# Patient Record
Sex: FEMALE | Race: WHITE | Employment: OTHER | URBAN - METROPOLITAN AREA
[De-identification: names, ages, dates, MRNs, and addresses within clinical notes are randomized per-mention and may not be internally consistent; named-entity substitution may affect disease eponyms.]

---

## 2017-03-18 ENCOUNTER — HOSPITAL ENCOUNTER (EMERGENCY)
Facility: CLINIC | Age: 42
Discharge: HOME | End: 2017-03-18
Attending: EMERGENCY MEDICINE | Admitting: EMERGENCY MEDICINE
Payer: COMMERCIAL

## 2017-03-18 ENCOUNTER — APPOINTMENT (OUTPATIENT)
Dept: GENERAL RADIOLOGY | Facility: CLINIC | Age: 42
End: 2017-03-18
Attending: EMERGENCY MEDICINE
Payer: COMMERCIAL

## 2017-03-18 VITALS
RESPIRATION RATE: 20 BRPM | DIASTOLIC BLOOD PRESSURE: 108 MMHG | SYSTOLIC BLOOD PRESSURE: 182 MMHG | OXYGEN SATURATION: 97 % | TEMPERATURE: 97.5 F

## 2017-03-18 DIAGNOSIS — M75.42 IMPINGEMENT SYNDROME OF LEFT SHOULDER: ICD-10-CM

## 2017-03-18 DIAGNOSIS — M25.512 LEFT ANTERIOR SHOULDER PAIN: ICD-10-CM

## 2017-03-18 PROCEDURE — 20610 DRAIN/INJ JOINT/BURSA W/O US: CPT | Mod: LT | Performed by: EMERGENCY MEDICINE

## 2017-03-18 PROCEDURE — 99283 EMERGENCY DEPT VISIT LOW MDM: CPT | Mod: 25 | Performed by: EMERGENCY MEDICINE

## 2017-03-18 PROCEDURE — 99283 EMERGENCY DEPT VISIT LOW MDM: CPT | Mod: 25

## 2017-03-18 PROCEDURE — 73030 X-RAY EXAM OF SHOULDER: CPT | Mod: LT

## 2017-03-18 PROCEDURE — 20610 DRAIN/INJ JOINT/BURSA W/O US: CPT | Mod: LT

## 2017-03-18 RX ORDER — TRIAMCINOLONE ACETONIDE 40 MG/ML
40 INJECTION, SUSPENSION INTRA-ARTICULAR; INTRAMUSCULAR ONCE
Status: DISCONTINUED | OUTPATIENT
Start: 2017-03-18 | End: 2017-03-18 | Stop reason: HOSPADM

## 2017-03-18 RX ORDER — OXYCODONE AND ACETAMINOPHEN 5; 325 MG/1; MG/1
1-2 TABLET ORAL EVERY 4 HOURS PRN
Qty: 15 TABLET | Refills: 0 | Status: SHIPPED | OUTPATIENT
Start: 2017-03-18 | End: 2021-10-27

## 2017-03-18 NOTE — ED AVS SNAPSHOT
[facility] Emergency Department    [address] [address]    Phone:  [phone]    Fax:  [phone]                                       Tonia Herndon   MRN: [id number]    Department:  [facility] Emergency Department   Date of Visit:  3/18/2017           After Visit Summary Signature Page     I have received my discharge instructions, and my questions have been answered. I have discussed any challenges I see with this plan with the nurse or doctor.    ..........................................................................................................................................  Patient/Patient Representative Signature      ..........................................................................................................................................  Patient Representative Print Name and Relationship to Patient    ..................................................               ................................................  Date                                            Time    ..........................................................................................................................................  Reviewed by Signature/Title    ...................................................              ..............................................  Date                                                            Time

## 2017-03-18 NOTE — DISCHARGE INSTRUCTIONS
Order placed for physical therapy  Please call physical therapy department Monday to establish 1st appointment  Ice 30 minutes 4 times daily  Gentle range of motion should be completed after icing  Ibuprofen or Aleve for mild to moderate pain  Percocet only for severe pain  Recommend calling the orthopedic sports medicine clinic and arrange an appointment for 2 weeks.

## 2017-03-18 NOTE — ED PROVIDER NOTES
History     Chief Complaint   Patient presents with     Shoulder Pain     left, no known injury, started Thursday     HPI     Ms. Tonia Herndon is a 41 year old female who presents to the ED today for evaluation of shoulder pain. The patient reports acute onset of severe left shoulder pain starting 3/16/17. She states pain is exacerbated with active or passive range of motion of her left shoulder. She endorses tenderness to palpation of the left shoulder. She notes pain has made it difficult to sleep on her left side. The patient is ultimately uncertain as to what may have caused her sudden pain. She wonders if this is due to sleeping in her left side in an unusual manner. She denies related shoulder related injury or trauma. She denies fall. She denies strain her left shoulder from lifting.     I have reviewed the Medications, Allergies, Past Medical and Surgical History, and Social History in the Epic system.    There is no problem list on file for this patient.    No current outpatient prescriptions on file.     No Known Allergies    Review of Systems   Musculoskeletal:        Left shoulder pain     Constitutional: Negative.    HENT: Negative.    Eyes: Negative.    Respiratory: Negative.    Cardiovascular: Negative.    Gastrointestinal: Negative.    Endocrine: Negative.    Genitourinary: Negative.    Musculoskeletal: Negative.    Skin: Negative.    Allergic/Immunologic: Negative.    Neurological: Negative.    Hematological: Negative.    Psychiatric/Behavioral: Negative.    All other systems reviewed and are negative.    Physical Exam   BP: (!) 187/116  Heart Rate: 97  Temp: 97.5  F (36.4  C)  Resp: 20  SpO2: 97 %    Physical Exam     Patient appears uncomfortable  Elevated blood pressure  Morbidly obese  Left shoulder exam: Arm held abducted alongside of patient.  Any movement with forward flexion or abduction caused severe pain.  There is no emptiness when palpating the glenohumeral joint to suggest any  subluxation or dislocation.  Very tender in the anterior joint line/capsule.  Positive impingement.  Positive drop arm test.  CMS left upper extremity normal  ED Course     ED Course     Procedures     Left shoulder injection:   Verbal consent  Shoulder area prepped with chlorhexidine scrub  Sterile technique  40 mg triamcinolone mixed with 4 cc 0.25% Marcaine  Anterior approach-complaining intra-articular injection.  Band-Aid applied             Results for orders placed or performed during the hospital encounter of 03/18/17   XR Shoulder Left G/E 3 Views    Narrative    XR SHOULDER LT G/E 3 VW 3/18/2017 9:17 AM    HISTORY: Pain      Impression    IMPRESSION: Negative.    SNOW ASHLEY MD         8:28 AM Patient Assessed.     Assessments & Plan (with Medical Decision Making)  41-year-old female presents with left shoulder pain.  No specific injury.  He was more active with [place] activities when she was traveling last week in [place].  Also did heavier lifting with suitcases etc.  Examination noted a near frozen shoulder with extreme loss of range of motion.  Tenderness primarily in the anterior joint line Bear the insertion of the supraspinatus.  She had evidence for impingement syndrome.  X-rays left shoulder normal  Left shoulder injection completed.  Patient referred to physical therapy.  Advised refraining from applying heat and switching to ice.  May use ibuprofen for mild to moderate pain. Percocet for severe pain.        I have reviewed the nursing notes.    I have reviewed the findings, diagnosis, plan and need for follow up with the patient.    New Prescriptions    No medications on file       Final diagnoses:   Left anterior shoulder pain   Impingement syndrome of left shoulder     This document serves as a record of the services and decisions personally performed and made by No att. providers found. It was created on HIS/HER behalf by Paul Valle, a trained medical scribe. The creation of this  document is based the provider's statements to the medical scribe.  Paul Valle 8:28 AM 3/18/2017    Provider:   The information in this document, created by the medical scribe for me, accurately reflects the services I personally performed and the decisions made by me. I have reviewed and approved this document for accuracy prior to leaving the patient care area.  No att. providers found 8:28 AM 3/18/2017    3/18/2017   [facility] EMERGENCY DEPARTMENT     Nathan Ruiz DO  03/18/17 0943

## 2017-03-18 NOTE — ED AVS SNAPSHOT
[facility] Emergency Department    [address] [address]    Phone:  [phone]    Fax:  [phone]                                       Tonia Herndon   MRN: [id number]    Department:  [facility] Emergency Department   Date of Visit:  3/18/2017           Patient Information     Date Of Birth          1975        Your diagnoses for this visit were:     Left anterior shoulder pain     Impingement syndrome of left shoulder        You were seen by Nathan Ruiz DO.      Follow-up Information     Follow up with Radha Wilhelm MD.    Specialty:  Family Practice    Contact information:    [facility]  [address] [address]  [phone]          Discharge Instructions       Order placed for physical therapy  Please call physical therapy department Monday to establish 1st appointment  Ice 30 minutes 4 times daily  Gentle range of motion should be completed after icing  Ibuprofen or Aleve for mild to moderate pain  Percocet only for severe pain  Recommend calling the orthopedic sports medicine clinic and arrange an appointment for 2 weeks.    24 Hour Appointment Hotline       To make an appointment at any [facility], call [phone] ([phone]). If you don't have a family doctor or clinic, we will help you find one. [facility] clinics are conveniently located to serve the needs of you and your family.          ED Discharge Orders     PHYSICAL THERAPY REFERRAL       *This therapy referral will be filtered to a centralized scheduling office at [facility] and the patient will receive a call to schedule an appointment at a [facility] location most convenient for them. *     [facility] provides Physical Therapy evaluation and treatment and many specialty services across the [facility] system.  If requesting a specialty program, please choose from the list below.    If you have not heard from the  scheduling office within 2 business days, please call [phone] for all locations, with the exception of [place], please call [phone].  Treatment: Evaluation & Treatment  Special Instructions/Modalities: none  Special Programs: None    Please be aware that coverage of these services is subject to the terms and limitations of your health insurance plan.  Call member services at your health plan with any benefit or coverage questions.      **Note to Provider:  If you are referring outside of [facility] for the therapy appointment, please list the name of the location in the  special instructions  above, print the referral and give to the patient to schedule the appointment.                     Review of your medicines      START taking        Dose / Directions Last dose taken    oxyCODONE-acetaminophen 5-325 MG per tablet   Commonly known as:  PERCOCET   Dose:  1-2 tablet   Quantity:  15 tablet        Take 1-2 tablets by mouth every 4 hours as needed for pain   Refills:  0                Prescriptions were sent or printed at these locations (1 Prescription)                   Other Prescriptions                Printed at Department/Unit printer (1 of 1)         oxyCODONE-acetaminophen (PERCOCET) 5-325 MG per tablet                Procedures and tests performed during your visit     XR Shoulder Left G/E 3 Views      Orders Needing Specimen Collection     None      Pending Results     No orders found from 3/16/2017 to 3/19/2017.            Pending Culture Results     No orders found from 3/16/2017 to 3/19/2017.             Test Results from your hospital stay     3/18/2017  9:18 AM - Interface, Radiant Ib      Narrative     XR SHOULDER LT G/E 3 VW 3/18/2017 9:17 AM    HISTORY: Pain        Impression     IMPRESSION: Negative.    SNOW ASHLEY MD                Thank you for choosing [facility]       Thank you for choosing [facility] for your care. Our goal is always to provide you with excellent care. Hearing back from  "our patients is one way we can continue to improve our services. Please take a few minutes to complete the written survey that you may receive in the mail after you visit with us. Thank you!        [employer] Information     [facility] lets you send messages to your doctor, view your test results, renew your prescriptions, schedule appointments and more. To sign up, go to [email or web address] . Click on \"Log in\" on the left side of the screen, which will take you to the Welcome page. Then click on \"Sign up Now\" on the right side of the page.     You will be asked to enter the access code listed below, as well as some personal information. Please follow the directions to create your username and password.     Your access code is: [email or web address]  Expires: 2017  9:40 AM     Your access code will  in 90 days. If you need help or a new code, please call your [facility] clinic or [phone].        Care EveryWhere ID     This is your Care EveryWhere ID. This could be used by other organizations to access your [facility] medical records  [id number]        After Visit Summary       This is your record. Keep this with you and show to your community pharmacist(s) and doctor(s) at your next visit.                  "

## 2017-08-05 ENCOUNTER — HEALTH MAINTENANCE LETTER (OUTPATIENT)
Age: 42
End: 2017-08-05

## 2021-10-12 ENCOUNTER — TRANSFERRED RECORDS (OUTPATIENT)
Dept: HEALTH INFORMATION MANAGEMENT | Facility: CLINIC | Age: 46
End: 2021-10-12

## 2021-10-12 LAB — PAP SMEAR - HIM PATIENT REPORTED: NEGATIVE

## 2021-10-15 ENCOUNTER — HOSPITAL ENCOUNTER (OUTPATIENT)
Dept: CT IMAGING | Facility: CLINIC | Age: 46
Discharge: HOME | End: 2021-10-15
Attending: OBSTETRICS & GYNECOLOGY | Admitting: OBSTETRICS & GYNECOLOGY
Payer: COMMERCIAL

## 2021-10-15 DIAGNOSIS — R19.00 ABDOMINAL MASS, UNSPECIFIED ABDOMINAL LOCATION: ICD-10-CM

## 2021-10-15 PROCEDURE — 74177 CT ABD & PELVIS W/CONTRAST: CPT

## 2021-10-15 PROCEDURE — 250N000011 HC RX IP 250 OP 636: Performed by: OBSTETRICS & GYNECOLOGY

## 2021-10-15 RX ORDER — IOPAMIDOL 755 MG/ML
100 INJECTION, SOLUTION INTRAVASCULAR ONCE
Status: COMPLETED | OUTPATIENT
Start: 2021-10-15 | End: 2021-10-15

## 2021-10-15 RX ADMIN — IOPAMIDOL 100 ML: 755 INJECTION, SOLUTION INTRAVENOUS at 08:27

## 2021-10-27 ENCOUNTER — OFFICE VISIT (OUTPATIENT)
Dept: FAMILY MEDICINE | Facility: CLINIC | Age: 46
End: 2021-10-27
Payer: COMMERCIAL

## 2021-10-27 VITALS
BODY MASS INDEX: 53.92 KG/M2 | DIASTOLIC BLOOD PRESSURE: 120 MMHG | SYSTOLIC BLOOD PRESSURE: 170 MMHG | HEART RATE: 92 BPM | WEIGHT: 293 LBS | OXYGEN SATURATION: 98 % | HEIGHT: 62 IN | TEMPERATURE: 97.2 F

## 2021-10-27 DIAGNOSIS — Z23 NEED FOR PROPHYLACTIC VACCINATION AND INOCULATION AGAINST INFLUENZA: ICD-10-CM

## 2021-10-27 DIAGNOSIS — R73.03 PREDIABETES: ICD-10-CM

## 2021-10-27 DIAGNOSIS — E78.5 HYPERLIPIDEMIA LDL GOAL <100: ICD-10-CM

## 2021-10-27 DIAGNOSIS — E66.01 MORBID OBESITY (H): ICD-10-CM

## 2021-10-27 DIAGNOSIS — I10 BENIGN ESSENTIAL HYPERTENSION: Primary | ICD-10-CM

## 2021-10-27 PROBLEM — E78.00 HIGH CHOLESTEROL: Status: ACTIVE | Noted: 2021-10-06

## 2021-10-27 PROBLEM — E78.00 HIGH CHOLESTEROL: Status: RESOLVED | Noted: 2021-10-06 | Resolved: 2021-10-27

## 2021-10-27 PROCEDURE — 90471 IMMUNIZATION ADMIN: CPT | Performed by: NURSE PRACTITIONER

## 2021-10-27 PROCEDURE — 90686 IIV4 VACC NO PRSV 0.5 ML IM: CPT | Performed by: NURSE PRACTITIONER

## 2021-10-27 PROCEDURE — 99204 OFFICE O/P NEW MOD 45 MIN: CPT | Mod: 25 | Performed by: NURSE PRACTITIONER

## 2021-10-27 RX ORDER — LOSARTAN POTASSIUM AND HYDROCHLOROTHIAZIDE 25; 100 MG/1; MG/1
1 TABLET ORAL DAILY
Qty: 90 TABLET | Refills: 0 | Status: SHIPPED | OUTPATIENT
Start: 2021-10-27 | End: 2021-11-11

## 2021-10-27 RX ORDER — NYSTATIN 100000 [USP'U]/G
POWDER TOPICAL
COMMUNITY
Start: 2021-10-07

## 2021-10-27 RX ORDER — ATORVASTATIN CALCIUM 20 MG/1
20 TABLET, FILM COATED ORAL DAILY
Qty: 90 TABLET | Refills: 3 | Status: SHIPPED | OUTPATIENT
Start: 2021-10-27

## 2021-10-27 RX ORDER — NYSTATIN AND TRIAMCINOLONE ACETONIDE 100000; 1 [USP'U]/G; MG/G
OINTMENT TOPICAL
COMMUNITY
Start: 2021-10-07

## 2021-10-27 ASSESSMENT — MIFFLIN-ST. JEOR: SCORE: 2061.29

## 2021-10-27 NOTE — PATIENT INSTRUCTIONS
Start Hyzaar 1 tablet daily morning    Start Lipitor 20 mg daily evening    Recheck labs and BP in 2 weeks     Will recheck cholesterol in 3 months

## 2021-10-27 NOTE — PROGRESS NOTES
"  Assessment & Plan     Benign essential hypertension    - recommended to start losartan-hydrochlorothiazide (HYZAAR) 100-25 MG tablet; Take 1 tablet by mouth daily  - Basic metabolic panel  (Ca, Cl, CO2, Creat, Gluc, K, Na, BUN); Future  -repeat BMP in 1 week and follow up with RN for BP recheck in 2 weeks   - atorvastatin (LIPITOR) 20 MG tablet; Take 1 tablet (20 mg) by mouth daily    Morbid obesity (H)  -recommended to work on weight loss  -will try Trulicity if covered by insurance to help with weight loss   - dulaglutide (TRULICITY) 0.75 MG/0.5ML pen; Inject 0.75 mg Subcutaneous every 7 days    Prediabetes    - dulaglutide (TRULICITY) 0.75 MG/0.5ML pen; Inject 0.75 mg Subcutaneous every 7 days    Hyperlipidemia LDL goal <100    - Lipid panel reflex to direct LDL Fasting; Future    Need for prophylactic vaccination and inoculation against influenza    - INFLUENZA VACCINE IM > 6 MONTHS VALENT IIV4 (AFLURIA/FLUZONE)         BMI:   Estimated body mass index is 60.9 kg/m  as calculated from the following:    Height as of this encounter: 1.562 m (5' 1.5\").    Weight as of this encounter: 148.6 kg (327 lb 9.6 oz).   Weight management plan: Discussed healthy diet and exercise guidelines    See Patient Instructions    Return in about 2 weeks (around 11/10/2021) for BP Recheck, Lab Work.    HOME [facility]    Salo Randall is a 46 year old who presents for the following health issues elevated blood pressure. She recently did labs through her OB GYN doctor, labs were normal, except for A1C in prediabetic range 6.0% and . Also had high BP during OB GYN visit.   Patient is feeling well, denies any symptoms of elevated blood pressure such as dizziness, headaches, chest pain and shortness of breath.  Denies history of hypertension except when she was pregnant 15 years ago, she had borderline high BP during third trimester. Had never been treated for " "hypertension.        Review of Systems   Constitutional, HEENT, cardiovascular, pulmonary, gi and gu systems are negative, except as otherwise noted.      Objective    BP (!) 170/120 (BP Location: Right arm, Patient Position: Sitting, Cuff Size: Adult Large)   Pulse 92   Temp 97.2  F (36.2  C) (Tympanic)   Ht 1.562 m (5' 1.5\")   Wt 148.6 kg (327 lb 9.6 oz)   SpO2 98%   BMI 60.90 kg/m    Body mass index is 60.9 kg/m .  Physical Exam   GENERAL: healthy, alert and no distress  EYES: Eyes grossly normal to inspection, PERRL and conjunctivae and sclerae normal  NECK: no adenopathy, no asymmetry, masses, or scars and thyroid normal to palpation  RESP: lungs clear to auscultation - no rales, rhonchi or wheezes  CV: regular rate and rhythm, normal S1 S2, no S3 or S4, no murmur, click or rub, no peripheral edema and peripheral pulses strong  MS: no gross musculoskeletal defects noted, no edema  NEURO: Normal strength and tone, mentation intact and speech normal  PSYCH: mentation appears normal, affect normal/bright                "

## 2021-11-11 ENCOUNTER — ALLIED HEALTH/NURSE VISIT (OUTPATIENT)
Dept: FAMILY MEDICINE | Facility: CLINIC | Age: 46
End: 2021-11-11
Payer: COMMERCIAL

## 2021-11-11 ENCOUNTER — LAB (OUTPATIENT)
Dept: LAB | Facility: CLINIC | Age: 46
End: 2021-11-11
Payer: COMMERCIAL

## 2021-11-11 ENCOUNTER — TELEPHONE (OUTPATIENT)
Dept: FAMILY MEDICINE | Facility: CLINIC | Age: 46
End: 2021-11-11

## 2021-11-11 VITALS — SYSTOLIC BLOOD PRESSURE: 168 MMHG | HEART RATE: 80 BPM | DIASTOLIC BLOOD PRESSURE: 82 MMHG

## 2021-11-11 DIAGNOSIS — I10 BENIGN ESSENTIAL HYPERTENSION: ICD-10-CM

## 2021-11-11 DIAGNOSIS — I10 BENIGN ESSENTIAL HYPERTENSION: Primary | ICD-10-CM

## 2021-11-11 LAB
ANION GAP SERPL CALCULATED.3IONS-SCNC: 3 MMOL/L (ref 3–14)
BUN SERPL-MCNC: 12 MG/DL (ref 7–30)
CALCIUM SERPL-MCNC: 9.3 MG/DL (ref 8.5–10.1)
CHLORIDE BLD-SCNC: 103 MMOL/L (ref 94–109)
CO2 SERPL-SCNC: 31 MMOL/L (ref 20–32)
CREAT SERPL-MCNC: 0.69 MG/DL (ref 0.52–1.04)
GFR SERPL CREATININE-BSD FRML MDRD: >90 ML/MIN/1.73M2
GLUCOSE BLD-MCNC: 101 MG/DL (ref 70–99)
POTASSIUM BLD-SCNC: 4.1 MMOL/L (ref 3.4–5.3)
SODIUM SERPL-SCNC: 137 MMOL/L (ref 133–144)

## 2021-11-11 PROCEDURE — 80048 BASIC METABOLIC PNL TOTAL CA: CPT

## 2021-11-11 PROCEDURE — 36415 COLL VENOUS BLD VENIPUNCTURE: CPT

## 2021-11-11 PROCEDURE — 99207 PR NO CHARGE NURSE ONLY: CPT

## 2021-11-11 RX ORDER — AMLODIPINE BESYLATE 5 MG/1
5 TABLET ORAL DAILY
Qty: 30 TABLET | Refills: 0 | Status: SHIPPED | OUTPATIENT
Start: 2021-11-11 | End: 2021-12-10

## 2021-11-11 NOTE — TELEPHONE ENCOUNTER
Called pt & read providers message. Pt verbalized understanding & will call & schedule an appt for RN BP check.    Neida Briones RN

## 2021-11-11 NOTE — TELEPHONE ENCOUNTER
Provider covering for Tonia Rosen Yanick is a 46 year old year old patient who comes in today for a Blood Pressure check because of new medication.   10/27 started on losartan-hydrochlorothiazide &  lipitor     Vital Signs as repeated by RN   166/78 HR 85  168/82 HR 88     Patient is taking medication as prescribed  Patient is tolerating medications well.    Patient is monitoring Blood Pressure at home.  Average readings if yes are   Pt has wrist BP monitor & states readings are inconsistent. Can range from 140-180's systolic    Current complaints: frequent headaches since starting medication. Pt does not normally have headaches.     Disposition:  Routed to provider for review     Neida Briones RN

## 2021-11-11 NOTE — PROGRESS NOTES
Tonia Herndon is a 46 year old year old patient who comes in today for a Blood Pressure check because of new medication.   10/27 started on losartan-hydrochlorothiazide &  lipitor    Vital Signs as repeated by RN   166/78 HR 85  168/82 HR 88    Patient is taking medication as prescribed  Patient is tolerating medications well.    Patient is monitoring Blood Pressure at home.  Average readings if yes are   Pt has wrist BP monitor & states readings are inconsistent. Can range from 140-180's systolic    Current complaints: frequent headaches since starting medication. Pt does not normally have headaches.    Disposition:  Routed to provider for review    Neida Briones RN

## 2021-11-11 NOTE — TELEPHONE ENCOUNTER
Recommend patient stop the medication  Start Amlodipine 5 mg once daily and schedule RN visit to recheck blood pressure and patient to bring her home blood pressure monitor with her.

## 2021-11-12 ENCOUNTER — HOSPITAL ENCOUNTER (OUTPATIENT)
Dept: MAMMOGRAPHY | Facility: CLINIC | Age: 46
Discharge: HOME | End: 2021-11-12
Attending: OBSTETRICS & GYNECOLOGY | Admitting: OBSTETRICS & GYNECOLOGY
Payer: COMMERCIAL

## 2021-11-12 DIAGNOSIS — Z12.31 SCREENING MAMMOGRAM, ENCOUNTER FOR: ICD-10-CM

## 2021-11-12 PROCEDURE — 77067 SCR MAMMO BI INCL CAD: CPT

## 2021-11-14 ENCOUNTER — HEALTH MAINTENANCE LETTER (OUTPATIENT)
Age: 46
End: 2021-11-14

## 2021-12-09 ENCOUNTER — TELEPHONE (OUTPATIENT)
Dept: FAMILY MEDICINE | Facility: CLINIC | Age: 46
End: 2021-12-09

## 2021-12-09 ENCOUNTER — ALLIED HEALTH/NURSE VISIT (OUTPATIENT)
Dept: FAMILY MEDICINE | Facility: CLINIC | Age: 46
End: 2021-12-09
Payer: COMMERCIAL

## 2021-12-09 VITALS — DIASTOLIC BLOOD PRESSURE: 96 MMHG | SYSTOLIC BLOOD PRESSURE: 174 MMHG | HEART RATE: 64 BPM

## 2021-12-09 DIAGNOSIS — I10 BENIGN ESSENTIAL HYPERTENSION: ICD-10-CM

## 2021-12-09 DIAGNOSIS — I10 BENIGN ESSENTIAL HYPERTENSION: Primary | ICD-10-CM

## 2021-12-09 PROCEDURE — 99207 PR NO CHARGE NURSE ONLY: CPT

## 2021-12-09 NOTE — TELEPHONE ENCOUNTER
Tonia Rosen MARIA C Herndon is a 46 year old year old patient who comes in today for a Blood Pressure check because of medication change.  11/11/21: pt stopped losartan-hydrochlorothiazide due to headaches & started on amlodipine 5mg     Vital Signs as repeated by RN   180/94 HR 70  174/96 HR 64     Patient is taking medication as prescribed  Patient is tolerating medications well.  Patient is not monitoring Blood Pressure at home. machine broke, needs to purchase new one  Current complaints: states headaches gone but has noticed more swelling in fingers since stopping the hydrochlorothiazide.     Disposition:  Routed to provider for review.     [facility] [place]     [facility]

## 2021-12-09 NOTE — TELEPHONE ENCOUNTER
BP uncontrolled, possible side effects from BP meds, Please help patient to schedule office visit    HOME [facility] CNP

## 2021-12-09 NOTE — PROGRESS NOTES
Tonia Herndon is a 46 year old year old patient who comes in today for a Blood Pressure check because of medication change.  11/11/21: pt stopped losartan-hydrochlorothiazide due to headaches & started on amlodipine 5mg    Vital Signs as repeated by RN   180/94 HR 70  174/96 HR 64    Patient is taking medication as prescribed  Patient is tolerating medications well.  Patient is not monitoring Blood Pressure at home. machine broke, needs to purchase new one  Current complaints: states headaches gone but has noticed more swelling in fingers since stopping the hydrochlorothiazide.    Disposition:  Routed to provider for review.    Pharmacy: [facility] in [place]    Neida Anselmo AUGUST

## 2021-12-10 RX ORDER — AMLODIPINE BESYLATE 5 MG/1
7.5 TABLET ORAL DAILY
Qty: 45 TABLET | Refills: 0 | Status: SHIPPED | OUTPATIENT
Start: 2021-12-10 | End: 2021-12-15

## 2021-12-10 NOTE — TELEPHONE ENCOUNTER
Please also advise patient to increase Norvasc to 7.5 mg daily and monitor BP at home and keep appt on 1/05 unless if her BP will still be significantly elevated or over 150/90 range then see any available provider within a week.     Thank you     HOME [facility] CNP

## 2021-12-10 NOTE — TELEPHONE ENCOUNTER
Scheduled with Jessika on 1/5/2021, was your fist opening, unless you want her to be sooner by someone else? Patient wants to know if she should take the Amlodipine still if so can it be refilled?    Chanda Isidro CSS on 12/10/2021 at 11:02 AM

## 2021-12-13 DIAGNOSIS — I10 BENIGN ESSENTIAL HYPERTENSION: ICD-10-CM

## 2021-12-14 NOTE — TELEPHONE ENCOUNTER
Patient given provider's information and recommendations below with understanding voiced.  She'll  Rx with new instructions and has no further questions at this time.     Hina Cox RN  [facility]

## 2021-12-15 RX ORDER — AMLODIPINE BESYLATE 5 MG/1
TABLET ORAL
Qty: 45 TABLET | Refills: 0 | Status: SHIPPED | OUTPATIENT
Start: 2021-12-15

## 2021-12-15 NOTE — TELEPHONE ENCOUNTER
Pending Prescriptions:                       Disp   Refills    amLODIPine (NORVASC) 5 MG tablet [Pharmac*45 tab*0            Sig: TAKE 1 AND 1/2 TABLETS BY MOUTH ONCE DAILY    Routing refill request to provider for review/approval because:  Labs out of range:    BP Readings from Last 3 Encounters:   12/09/21 (!) 174/96   11/11/21 (!) 168/82   10/27/21 (!) 170/120       Lee Chinchilla RN

## 2022-11-20 ENCOUNTER — HEALTH MAINTENANCE LETTER (OUTPATIENT)
Age: 47
End: 2022-11-20

## 2023-04-15 ENCOUNTER — HEALTH MAINTENANCE LETTER (OUTPATIENT)
Age: 48
End: 2023-04-15

## 2023-11-25 ENCOUNTER — HEALTH MAINTENANCE LETTER (OUTPATIENT)
Age: 48
End: 2023-11-25

## 2024-02-03 ENCOUNTER — HEALTH MAINTENANCE LETTER (OUTPATIENT)
Age: 49
End: 2024-02-03